# Patient Record
Sex: MALE | Race: BLACK OR AFRICAN AMERICAN | NOT HISPANIC OR LATINO | Employment: FULL TIME | URBAN - METROPOLITAN AREA
[De-identification: names, ages, dates, MRNs, and addresses within clinical notes are randomized per-mention and may not be internally consistent; named-entity substitution may affect disease eponyms.]

---

## 2017-03-22 ENCOUNTER — HOSPITAL ENCOUNTER (OUTPATIENT)
Facility: HOSPITAL | Age: 72
Discharge: HOME | End: 2017-03-23
Attending: EMERGENCY MEDICINE | Admitting: INTERNAL MEDICINE

## 2017-03-22 DIAGNOSIS — I25.110 CORONARY ARTERY DISEASE INVOLVING NATIVE CORONARY ARTERY OF NATIVE HEART WITH UNSTABLE ANGINA PECTORIS: ICD-10-CM

## 2017-03-22 DIAGNOSIS — R07.9 ACUTE CHEST PAIN: Primary | ICD-10-CM

## 2017-03-22 DIAGNOSIS — I10 ESSENTIAL HYPERTENSION: ICD-10-CM

## 2017-03-22 DIAGNOSIS — E29.1 TESTICULAR HYPOFUNCTION: ICD-10-CM

## 2017-03-22 LAB
ALBUMIN SERPL BCP-MCNC: 3.7 G/DL
ALP SERPL-CCNC: 55 U/L
ALT SERPL W/O P-5'-P-CCNC: 52 U/L
ANION GAP SERPL CALC-SCNC: 9 MMOL/L
AST SERPL-CCNC: 33 U/L
BASOPHILS # BLD AUTO: 0.02 K/UL
BASOPHILS NFR BLD: 0.3 %
BILIRUB SERPL-MCNC: 0.4 MG/DL
BNP SERPL-MCNC: <10 PG/ML
BUN SERPL-MCNC: 16 MG/DL
CALCIUM SERPL-MCNC: 9.5 MG/DL
CHLORIDE SERPL-SCNC: 102 MMOL/L
CK MB SERPL-MCNC: 1.3 NG/ML
CK MB SERPL-RTO: 0.7 %
CK SERPL-CCNC: 176 U/L
CO2 SERPL-SCNC: 27 MMOL/L
CREAT SERPL-MCNC: 1.3 MG/DL
DIFFERENTIAL METHOD: ABNORMAL
EOSINOPHIL # BLD AUTO: 0.2 K/UL
EOSINOPHIL NFR BLD: 2.6 %
ERYTHROCYTE [DISTWIDTH] IN BLOOD BY AUTOMATED COUNT: 13.1 %
EST. GFR  (AFRICAN AMERICAN): >60 ML/MIN/1.73 M^2
EST. GFR  (NON AFRICAN AMERICAN): 55 ML/MIN/1.73 M^2
GLUCOSE SERPL-MCNC: 99 MG/DL
HCT VFR BLD AUTO: 42.8 %
HGB BLD-MCNC: 14.8 G/DL
LYMPHOCYTES # BLD AUTO: 2 K/UL
LYMPHOCYTES NFR BLD: 29.5 %
MCH RBC QN AUTO: 31.2 PG
MCHC RBC AUTO-ENTMCNC: 34.6 %
MCV RBC AUTO: 90 FL
MONOCYTES # BLD AUTO: 0.6 K/UL
MONOCYTES NFR BLD: 8.4 %
NEUTROPHILS # BLD AUTO: 4.1 K/UL
NEUTROPHILS NFR BLD: 59.1 %
PLATELET # BLD AUTO: 242 K/UL
PMV BLD AUTO: 11.1 FL
POCT GLUCOSE: 107 MG/DL (ref 70–110)
POTASSIUM SERPL-SCNC: 3.5 MMOL/L
PROT SERPL-MCNC: 7 G/DL
RBC # BLD AUTO: 4.74 M/UL
SODIUM SERPL-SCNC: 138 MMOL/L
TROPONIN I SERPL DL<=0.01 NG/ML-MCNC: <0.006 NG/ML
TROPONIN I SERPL DL<=0.01 NG/ML-MCNC: <0.006 NG/ML
WBC # BLD AUTO: 6.88 K/UL

## 2017-03-22 PROCEDURE — 80053 COMPREHEN METABOLIC PANEL: CPT

## 2017-03-22 PROCEDURE — 99222 1ST HOSP IP/OBS MODERATE 55: CPT | Mod: ,,, | Performed by: INTERNAL MEDICINE

## 2017-03-22 PROCEDURE — 25000003 PHARM REV CODE 250: Performed by: INTERNAL MEDICINE

## 2017-03-22 PROCEDURE — G0378 HOSPITAL OBSERVATION PER HR: HCPCS

## 2017-03-22 PROCEDURE — 93571 IV DOP VEL&/PRESS C FLO 1ST: CPT | Mod: 74,LC

## 2017-03-22 PROCEDURE — 85025 COMPLETE CBC W/AUTO DIFF WBC: CPT

## 2017-03-22 PROCEDURE — 82962 GLUCOSE BLOOD TEST: CPT

## 2017-03-22 PROCEDURE — 93005 ELECTROCARDIOGRAM TRACING: CPT

## 2017-03-22 PROCEDURE — 92978 ENDOLUMINL IVUS OCT C 1ST: CPT | Mod: 26,LC,, | Performed by: INTERNAL MEDICINE

## 2017-03-22 PROCEDURE — 93458 L HRT ARTERY/VENTRICLE ANGIO: CPT | Mod: 26,,, | Performed by: INTERNAL MEDICINE

## 2017-03-22 PROCEDURE — C1753 CATH, INTRAVAS ULTRASOUND: HCPCS

## 2017-03-22 PROCEDURE — 25500020 PHARM REV CODE 255

## 2017-03-22 PROCEDURE — 25000003 PHARM REV CODE 250

## 2017-03-22 PROCEDURE — 25000003 PHARM REV CODE 250: Performed by: EMERGENCY MEDICINE

## 2017-03-22 PROCEDURE — 82553 CREATINE MB FRACTION: CPT

## 2017-03-22 PROCEDURE — 84484 ASSAY OF TROPONIN QUANT: CPT

## 2017-03-22 PROCEDURE — 63600175 PHARM REV CODE 636 W HCPCS

## 2017-03-22 PROCEDURE — 92978 ENDOLUMINL IVUS OCT C 1ST: CPT | Mod: LC

## 2017-03-22 PROCEDURE — 84484 ASSAY OF TROPONIN QUANT: CPT | Mod: 91

## 2017-03-22 PROCEDURE — 93571 IV DOP VEL&/PRESS C FLO 1ST: CPT | Mod: 26,52,LC, | Performed by: INTERNAL MEDICINE

## 2017-03-22 PROCEDURE — 99285 EMERGENCY DEPT VISIT HI MDM: CPT | Mod: 25

## 2017-03-22 PROCEDURE — 83880 ASSAY OF NATRIURETIC PEPTIDE: CPT

## 2017-03-22 RX ORDER — CLOPIDOGREL BISULFATE 75 MG/1
600 TABLET ORAL
Status: COMPLETED | OUTPATIENT
Start: 2017-03-22 | End: 2017-03-22

## 2017-03-22 RX ORDER — OXYCODONE HYDROCHLORIDE 5 MG/1
5 TABLET ORAL EVERY 4 HOURS PRN
Status: DISCONTINUED | OUTPATIENT
Start: 2017-03-22 | End: 2017-03-23 | Stop reason: HOSPADM

## 2017-03-22 RX ORDER — SODIUM CHLORIDE 9 MG/ML
INJECTION, SOLUTION INTRAVENOUS ONCE
Status: CANCELLED | OUTPATIENT
Start: 2017-03-22 | End: 2017-03-22

## 2017-03-22 RX ORDER — ASPIRIN 325 MG
325 TABLET ORAL
Status: COMPLETED | OUTPATIENT
Start: 2017-03-22 | End: 2017-03-22

## 2017-03-22 RX ORDER — SODIUM CHLORIDE 9 MG/ML
3 INJECTION, SOLUTION INTRAVENOUS CONTINUOUS
Status: ACTIVE | OUTPATIENT
Start: 2017-03-22 | End: 2017-03-22

## 2017-03-22 RX ORDER — ONDANSETRON 8 MG/1
8 TABLET, ORALLY DISINTEGRATING ORAL EVERY 8 HOURS PRN
Status: DISCONTINUED | OUTPATIENT
Start: 2017-03-22 | End: 2017-03-23 | Stop reason: HOSPADM

## 2017-03-22 RX ORDER — NITROGLYCERIN 0.4 MG/1
0.4 TABLET SUBLINGUAL EVERY 5 MIN PRN
Status: DISCONTINUED | OUTPATIENT
Start: 2017-03-22 | End: 2017-03-23 | Stop reason: HOSPADM

## 2017-03-22 RX ADMIN — CLOPIDOGREL 600 MG: 75 TABLET, FILM COATED ORAL at 01:03

## 2017-03-22 RX ADMIN — ASPIRIN 325 MG ORAL TABLET 325 MG: 325 PILL ORAL at 11:03

## 2017-03-22 RX ADMIN — NITROGLYCERIN 0.4 MG: 0.4 TABLET SUBLINGUAL at 11:03

## 2017-03-22 RX ADMIN — SODIUM CHLORIDE 3 ML/KG/HR: 0.9 INJECTION, SOLUTION INTRAVENOUS at 04:03

## 2017-03-22 NOTE — IP AVS SNAPSHOT
[facility]  [address]  [place] [address]  Phone: [phone]           Patient Discharge Instructions     Our goal is to set you up for success. This packet includes information on your condition, medications, and your home care. It will help you to care for yourself so you don't get sicker and need to go back to the hospital.     Please ask your nurse if you have any questions.        There are many details to remember when preparing to leave the hospital. Here is what you will need to do:    1. Take your medicine. If you are prescribed medications, review your Medication List in the following pages. You may have new medications to  at the pharmacy and others that you'll need to stop taking. Review the instructions for how and when to take your medications. Talk with your doctor or nurses if you are unsure of what to do.     2. Go to your follow-up appointments. Specific follow-up information is listed in the following pages. Your may be contacted by a transition nurse or clinical provider about future appointments. Be sure we have all of the phone numbers to reach you, if needed. Please contact your provider's office if you are unable to make an appointment.     3. Watch for warning signs. Your doctor or nurse will give you detailed warning signs to watch for and when to call for assistance. These instructions may also include educational information about your condition. If you experience any of warning signs to your health, call your doctor.               [facility]  Unless otherwise directed by your provider, please contact [facility], our nurse care line that is available for 24/7 assistance.     [phone] (toll-free)    Registered nurses in the [facility] provide clinical advisement, health education, appointment booking, and other advisory services.                    ** Verify the list of medication(s) below is accurate and up to date. Carry this  with you in case of emergency. If your medications have changed, please notify your healthcare provider.             Medication List      START taking these medications        Additional Info                      aspirin 81 MG Chew   Refills:  0   Dose:  81 mg    Instructions:  Take 1 tablet (81 mg total) by mouth once daily.     Begin Date    AM    Noon    PM    Bedtime       atorvastatin 40 MG tablet   Commonly known as:  LIPITOR   Quantity:  90 tablet   Refills:  3   Dose:  40 mg    Instructions:  Take 1 tablet (40 mg total) by mouth once daily.     Begin Date    AM    Noon    PM    Bedtime       carvedilol 3.125 MG tablet   Commonly known as:  COREG   Quantity:  60 tablet   Refills:  11   Dose:  3.125 mg    Instructions:  Take 1 tablet (3.125 mg total) by mouth 2 (two) times daily with meals.     Begin Date    AM    Noon    PM    Bedtime       nitroGLYCERIN 0.4 MG SL tablet   Commonly known as:  NITROSTAT   Quantity:  25 tablet   Refills:  11   Dose:  0.4 mg    Last time this was given:  0.4 mg on 3/22/2017 11:15 AM   Instructions:  Place 1 tablet (0.4 mg total) under the tongue every 5 (five) minutes as needed for Chest pain.     Begin Date    AM    Noon    PM    Bedtime         CONTINUE taking these medications        Additional Info                      INV LISINOPRIL-HCTZ 20-12.5   Refills:  0   Dose:  1 tablet    Instructions:  Take 1 tablet by mouth once daily. For investigational use only.     Begin Date    AM    Noon    PM    Bedtime         STOP taking these medications     UNKNOWN TO PATIENT            Where to Get Your Medications      These medications were sent to [facility]  [place] - [address] [facility]  [address] [place] [address]     Phone:  [phone]     atorvastatin 40 MG tablet    carvedilol 3.125 MG tablet    nitroGLYCERIN 0.4 MG SL tablet         You can get these medications from any pharmacy     You don't need a prescription for  these medications     aspirin 81 MG Chew                  Please bring to all follow up appointments:    1. A copy of your discharge instructions.  2. All medicines you are currently taking in their original bottles.  3. Identification and insurance card.    Please arrive 15 minutes ahead of scheduled appointment time.    Please call 24 hours in advance if you must reschedule your appointment and/or time.        Your Scheduled Appointments     Mar 30, 2017  1:00 PM CDT   Established Patient Visit with KIKI Nichols - Cardiology ([place])    [address]  [place] [id number] [phone]              Follow-up Information     Follow up with Siva Steele NP On 3/30/2017.    Specialty:  Internal Medicine    Why:  time: 1:00    Contact information:    [address]  [place] [address]  [phone]          Discharge Instructions     Future Orders    Diet general     Questions:    Total calories:      Fat restriction, if any:      Protein restriction, if any:      Na restriction, if any:      Fluid restriction:      Additional restrictions:  Cardiac (Low Na/Chol)    Lifting restrictions     Comments:    No heavy lifting over 10 lbs for 3-5 days    No dressing needed         Discharge Instructions       EATING HEART-HEALTHY FOODS (ENGLISH) View Edit Remove   HEART DISEASE, RISK FACTORS (ENGLISH) View Edit Remove   HEART DISEASE EDUCATION (ENGLISH) View Edit Remove   EXERCISE FOR A HEALTHIER HEART (ENGLISH) View Edit Remove   PREVENTION GUIDELINES, MEN AGES 65 AND OLDER (ENGLISH) View Edit Remove   CARDIAC CATHETERIZATION, DISCHARGE INSTRUCTIONS FOR (ENGLISH) View Edit Remove   ASPIRIN, ASA ORAL TABLETS (ENGLISH) View Edit Remove   ATORVASTATIN TABLETS (ENGLISH) View Edit Remove   CARVEDILOL TABLETS (ENGLISH) View Edit Remove         Discharge References/Attachments     EATING HEART-HEALTHY FOODS (ENGLISH)    HEART DISEASE, RISK FACTORS (ENGLISH)    HEART DISEASE EDUCATION  "(ENGLISH)    EXERCISE FOR A HEALTHIER HEART (ENGLISH)    PREVENTION GUIDELINES, MEN AGES 65 AND OLDER (ENGLISH)    CARDIAC CATHETERIZATION, DISCHARGE INSTRUCTIONS FOR (ENGLISH)    ASPIRIN, ASA ORAL TABLETS (ENGLISH)    ATORVASTATIN TABLETS (ENGLISH)    CARVEDILOL TABLETS (ENGLISH)        Primary Diagnosis     Your primary diagnosis was:  Acute Chest Pain      Admission Information     Date & Time Provider Department CSN    3/22/2017 10:28 AM Franklin Anthony MD [facility] [id number]      Care Providers     Provider Role Specialty Primary office phone    Franklin Anthony MD Attending Provider Cardiology [phone]      Your Vitals Were     BP Pulse Temp Resp Height Weight    157/83 (BP Location: Right arm, Patient Position: Lying, BP Method: Automatic) 69 98.6 °F (37 °C) (Oral) 18 6' 3" (1.905 m) 97.5 kg (215 lb)    SpO2 BMI             100% 26.87 kg/m2         Recent Lab Values     No lab values to display.      Allergies as of 3/23/2017     No Known Allergies      Advance Directives     An advance directive is a document which, in the event you are no longer able to make decisions for yourself, tells your healthcare team what kind of treatment you do or do not want to receive, or who you would like to make those decisions for you.  If you do not currently have an advance directive, [facility] encourages you to create one.  For more information call:  [phone]), [phone] ([phone]),  or log on to [email or web address].        Smoking Cessation     If you would like to quit smoking:   You may be eligible for free services if you are a [place] resident and started smoking cigarettes before September 1, 1988.  Call the Smoking Cessation [employer] (SCT) toll free at (580) 081-8111 or (431) 321-5410.   Call 3-800QUIT-NOW if you do not meet the above criteria.            Language Assistance Services     ATTENTION: Language assistance services are available, free of " charge. Please call [phone].      ATENCIÓN: Si barney ray, tiene a elmore disposición servicios gratuitos de asistencia lingüística. Llame al [phone].     CHÚ Ý: N?u b?n nói Ti?ng Vi?t, có các d?ch v? h? tr? ngôn ng? mi?n phí dành cho b?n. G?i s? [phone].        [facility] Sign-Up     Activating your [facility] account is as easy as 1-2-3!     1) Visit [email or web address], select Sign Up Now, enter this activation code and your date of birth, then select Next.  [email or web address]  Expires: 5/7/2017  9:46 AM      2) Create a username and password to use when you visit [facility] in the future and select a security question in case you lose your password and select Next.    3) Enter your e-mail address and click Sign Up!    Additional Information  If you have questions, please e-mail [email or web address] or call [phone] to talk to our [facility] staff. Remember, [facility] is NOT to be used for urgent needs. For medical emergencies, dial 911.          [facility] complies with applicable Federal civil rights laws and does not discriminate on the basis of race, color, national origin, age, disability, or sex.

## 2017-03-22 NOTE — SUBJECTIVE & OBJECTIVE
Past Medical History:   Diagnosis Date    Hypertension        Past Surgical History:   Procedure Laterality Date    APPENDECTOMY         Review of patient's allergies indicates:  No Known Allergies    No current facility-administered medications on file prior to encounter.      Current Outpatient Prescriptions on File Prior to Encounter   Medication Sig    UNKNOWN TO PATIENT      Family History     None        Social History Main Topics    Smoking status: Former Smoker    Smokeless tobacco: Not on file    Alcohol use No    Drug use: No    Sexual activity: Not Currently     Partners: Female     Review of Systems   Constitution: Negative for diaphoresis, weakness, malaise/fatigue, weight gain and weight loss.   Cardiovascular: Positive for chest pain. Negative for claudication, cyanosis, dyspnea on exertion, irregular heartbeat, leg swelling, near-syncope, orthopnea, palpitations, paroxysmal nocturnal dyspnea and syncope.   Respiratory: Negative for cough, hemoptysis, shortness of breath, sleep disturbances due to breathing, snoring, sputum production and wheezing.    Hematologic/Lymphatic: Negative for bleeding problem. Does not bruise/bleed easily.   Skin: Negative for poor wound healing and rash.   Musculoskeletal: Negative for myalgias.   Gastrointestinal: Negative for heartburn, hematemesis, hematochezia and melena.   Neurological: Negative for dizziness, light-headedness and loss of balance.   Psychiatric/Behavioral: Negative for altered mental status, depression and memory loss.     Objective:     Vital Signs (Most Recent):  Temp: 97.8 °F (36.6 °C) (03/22/17 1445)  Pulse: 62 (03/22/17 1530)  Resp: 14 (03/22/17 1530)  BP: (!) 152/70 (03/22/17 1530)  SpO2: 99 % (03/22/17 1530) Vital Signs (24h Range):  Temp:  [97.8 °F (36.6 °C)] 97.8 °F (36.6 °C)  Pulse:  [62-73] 62  Resp:  [14-20] 14  SpO2:  [95 %-99 %] 99 %  BP: (118-164)/(66-87) 152/70     Weight: 97.5 kg (215 lb)  Body mass index is 26.87  kg/(m^2).    SpO2: 99 %  O2 Device (Oxygen Therapy): room air    No intake or output data in the 24 hours ending 03/22/17 1546    Lines/Drains/Airways     Peripheral Intravenous Line                 Peripheral IV - Single Lumen 03/22/17 1059 Left Antecubital less than 1 day                Physical Exam   Constitutional: He is oriented to person, place, and time. He appears well-developed and well-nourished. No distress. He is not intubated.   HENT:   Head: Atraumatic.   Neck: No JVD present.   Cardiovascular: Normal rate, regular rhythm, S1 normal, S2 normal, normal heart sounds and intact distal pulses.  PMI is not displaced.  Exam reveals no gallop, no S3, no S4, no distant heart sounds, no friction rub, no midsystolic click and no opening snap.    No murmur heard.  Pulses:       Carotid pulses are 2+ on the right side, and 2+ on the left side.       Radial pulses are 2+ on the right side, and 2+ on the left side.        Femoral pulses are 2+ on the right side, and 2+ on the left side.       Popliteal pulses are 2+ on the right side, and 2+ on the left side.        Dorsalis pedis pulses are 2+ on the right side, and 2+ on the left side.        Posterior tibial pulses are 2+ on the right side, and 2+ on the left side.   Pulmonary/Chest: Effort normal and breath sounds normal. No accessory muscle usage. No apnea, no tachypnea and no bradypnea. He is not intubated. No respiratory distress. He has no decreased breath sounds. He has no wheezes. He has no rhonchi. He has no rales. He exhibits no tenderness.   Abdominal: Soft. Normal aorta and bowel sounds are normal. He exhibits no distension, no abdominal bruit, no pulsatile midline mass and no mass. There is no tenderness.   Musculoskeletal: He exhibits no edema.   Neurological: He is alert and oriented to person, place, and time.   Skin: Skin is warm. No rash noted. No erythema. No pallor.   Psychiatric: He has a normal mood and affect. His behavior is normal.  Judgment and thought content normal.   Vitals reviewed.      Significant Labs:   CMP   Recent Labs  Lab 03/22/17  1059      K 3.5      CO2 27   GLU 99   BUN 16   CREATININE 1.3   CALCIUM 9.5   PROT 7.0   ALBUMIN 3.7   BILITOT 0.4   ALKPHOS 55   AST 33   ALT 52*   ANIONGAP 9   ESTGFRAFRICA >60   EGFRNONAA 55*   , CBC   Recent Labs  Lab 03/22/17  1059   WBC 6.88   HGB 14.8   HCT 42.8      , INR No results for input(s): INR, PROTIME in the last 48 hours., Lipid Panel No results for input(s): CHOL, HDL, LDLCALC, TRIG, CHOLHDL in the last 48 hours. and Troponin   Recent Labs  Lab 03/22/17  1059   TROPONINI <0.006       Significant Imaging: Cardiac Cath: normal and X-Ray: CXR: X-Ray Chest 1 View (CXR): No results found for this visit on 03/22/17. and X-Ray Chest PA and Lateral (CXR): No results found for this visit on 03/22/17.

## 2017-03-22 NOTE — ED NOTES
Pt reports intermittent L sided chest pain x 1 week that became constant last night. Denies headache, vision changes, and SOB.  Occasional dizziness and nausea. Hx HTN. VSS, NAD.    APPEARANCE: Alert, oriented and in no acute distress.  CARDIAC: Normal rate and rhythm, no murmur heard.   PERIPHERAL VASCULAR: peripheral pulses present. Normal cap refill. No edema. Warm to touch.    RESPIRATORY:Normal rate and effort, breath sounds clear bilaterally throughout chest. Respirations are equal and unlabored no obvious signs of distress.  GASTRO: soft, bowel sounds normal, no tenderness, no abdominal distention.  MUSC: Full ROM. No bony tenderness or soft tissue tenderness. No obvious deformity.  SKIN: Skin is warm and dry, normal skin turgor, mucous membranes moist.  NEURO: 5/5 strength major flexors/extensors bilaterally. Sensory intact to light touch bilaterally. Weatherby coma scale: eyes open spontaneously-4, oriented & converses-5, obeys commands-6. No neurological abnormalities.   MENTAL STATUS: awake, alert and aware of environment.  EYE: PERRL, both eyes: pupils brisk and reactive to light. Normal size.  ENT: EARS: no obvious drainage. NOSE: no active bleeding.

## 2017-03-22 NOTE — NURSING TRANSFER
Gave report to CHLOE Mcdaniel at 1615 per protocol.  Released a total of 5 ml of air from [facility] prior to transfer.  Site is CDI and free of hematoma with good pulses.  Pt transferred  via stretcher to 2A room 231 with tele monitoring.  Family already present in room when arrived.  V/s stable all throughout recovery.  Pt has no complaints.  Informed CHLOE Mcdaniel of most recent vitals.  Informed RN that pt has not voided as of yet and that he has not eaten as well.  Disconnected transport monitor, room tele monitoring attached and assisted with moving patient to hospital bed.  Patient care transfered

## 2017-03-22 NOTE — ED PROVIDER NOTES
Encounter Date: 3/22/2017       History     Chief Complaint   Patient presents with    Chest Pain     began last night, nausea     Review of patient's allergies indicates:  No Known Allergies  HPI Comments: Patient is a 72 year old male who presents with chest pain that began about 3am this morning.  Had two episdoes.  First episode lasted about 10 minutes.  + dyspnea and diaphoresis.  No vomiting.  + nausea.  Former smoker.  No h/o drug abuse.  Resolved at this time.  Mild to moderate in severity.  No h/o CAD or CHF.     Patient is a 72 y.o. male presenting with the following complaint: chest pain. The history is provided by the patient.   Chest Pain   The current episode started today. Episode Length: first episode lasted approximately 10 minutes, second episode lasted longer, but unsure of duration. Episode frequency: twice. The chest pain is improving. The quality of the pain is described as aching. The pain does not radiate. Exacerbated by: palpation of chest wall worsens pain. Primary symptoms include shortness of breath. Pertinent negatives for primary symptoms include no fever, no cough and no abdominal pain.   The shortness of breath began today. The patient's medical history does not include CHF or COPD.   Associated symptoms include diaphoresis. He tried nothing for the symptoms. Risk factors include male gender and smoking/tobacco exposure.   His past medical history is significant for hypertension.   Pertinent negatives for past medical history include no CAD, no CHF, no diabetes and no hyperlipidemia.     Past Medical History:   Diagnosis Date    Hypertension      Past Surgical History:   Procedure Laterality Date    APPENDECTOMY       History reviewed. No pertinent family history.  Social History   Substance Use Topics    Smoking status: Former Smoker    Smokeless tobacco: None    Alcohol use No     Review of Systems   Constitutional: Positive for diaphoresis. Negative for fever.   HENT:  Negative for sore throat.    Eyes: Negative for pain.   Respiratory: Positive for shortness of breath. Negative for cough.    Cardiovascular: Positive for chest pain.   Gastrointestinal: Negative for abdominal pain.   Genitourinary: Negative for dysuria.   Musculoskeletal: Negative for back pain.   Skin: Negative for rash.   Neurological: Negative for headaches.   Psychiatric/Behavioral: Negative for confusion.       Physical Exam   Initial Vitals   BP Pulse Resp Temp SpO2   03/22/17 1025 03/22/17 1025 03/22/17 1025 03/22/17 1025 03/22/17 1025   164/85 73 20 97.8 °F (36.6 °C) 99 %     Physical Exam    Nursing note and vitals reviewed.  Constitutional: He appears well-developed and well-nourished. He is not diaphoretic. No distress.   HENT:   Head: Normocephalic and atraumatic.   Eyes: Conjunctivae are normal.   Neck: Normal range of motion. Neck supple.   Cardiovascular: Normal rate and regular rhythm.   Pulmonary/Chest: Breath sounds normal. No respiratory distress. He has no wheezes. He exhibits tenderness (left sided chest wall pain).   Abdominal: Soft. He exhibits no distension. There is no tenderness.   Musculoskeletal: Normal range of motion.   Neurological: He is alert and oriented to person, place, and time. He has normal strength.   Skin: Skin is warm and dry.   Psychiatric: He has a normal mood and affect.         ED Course   Procedures  Labs Reviewed   CBC W/ AUTO DIFFERENTIAL - Abnormal; Notable for the following:        Result Value    MCH 31.2 (*)     All other components within normal limits    Narrative:     PLEASE REVIEW ORDER START TIME BEFORE MARKING SPECIMEN  COLLECTED.   COMPREHENSIVE METABOLIC PANEL - Abnormal; Notable for the following:     ALT 52 (*)     eGFR if non  55 (*)     All other components within normal limits    Narrative:     PLEASE REVIEW ORDER START TIME BEFORE MARKING SPECIMEN  COLLECTED.   TROPONIN I    Narrative:     PLEASE REVIEW ORDER START TIME BEFORE  MARKING SPECIMEN  COLLECTED.   B-TYPE NATRIURETIC PEPTIDE    Narrative:     PLEASE REVIEW ORDER START TIME BEFORE MARKING SPECIMEN  COLLECTED.   TROPONIN I   POCT GLUCOSE     EKG Readings: (Independently Interpreted)   Initial Reading: No STEMI. Rhythm: Normal Sinus Rhythm. Heart Rate: 72. Ectopy: No Ectopy. Other Impression: nonspecific ST and Twave changes          Medical Decision Making:   Differential Diagnosis:   DDx: angina, unstable angina, NSTEMI, PE, pneumonia, pneumothorax  Clinical Tests:   Lab Tests: Ordered and Reviewed  The following lab test(s) were unremarkable: CBC, CMP, Troponin and BNP  Radiological Study: Ordered and Reviewed  Medical Tests: Ordered and Reviewed  ED Management:  Patient with possible unstable angina.  Normal blood work.  Will admit to Cardiology for further evaluation and rule out. Dr. Anthony.                   ED Course     Clinical Impression:   The encounter diagnosis was Acute chest pain.          Leonel Rendon MD  03/22/17 1610       Leonel Rendon MD  03/22/17 5752

## 2017-03-22 NOTE — H&P
[facility]  Cardiology  History and Physical     Patient Name: Jasmina Alonso  MRN: [id number]  Admission Date: 3/22/2017  Code Status: Full Code   Attending Provider: Leonel Rendon MD   Primary Care Physician: Primary Doctor No  Principal Problem:Acute chest pain    Patient information was obtained from patient and ER records.     Subjective:     Chief Complaint:  Chest pain     HPI:  Mr. Alonso is a 72 year-old [place] male who presented to the ED at [facility] earlier today with complaint of chest discomfort.  Patient reports symptoms began about 2 weeks ago, and have been intermittent, but worse with exertion such as ambulation.  Describes the symptoms as a squeezing discomfort, radiating into the left arm and the neck, lasting about 10 minutes when it occurs.  The symptoms were persisting and were worse today which provoked a visit to the ED.  The symptoms improved with SL NTG and ASA in the ED.  He also has had associated symptoms of diaphoresis with the chest discomfort, and symptoms have been frequently nocturnal also.      Past Medical History:   Diagnosis Date    Hypertension        Past Surgical History:   Procedure Laterality Date    APPENDECTOMY         Review of patient's allergies indicates:  No Known Allergies    No current facility-administered medications on file prior to encounter.      Current Outpatient Prescriptions on File Prior to Encounter   Medication Sig    UNKNOWN TO PATIENT      Family History     None        Social History Main Topics    Smoking status: Former Smoker    Smokeless tobacco: Not on file    Alcohol use No    Drug use: No    Sexual activity: Not Currently     Partners: Female     Review of Systems   Constitution: Negative for diaphoresis, weakness, malaise/fatigue, weight gain and weight loss.   Cardiovascular: Positive for chest pain. Negative for claudication, cyanosis, dyspnea on exertion, irregular heartbeat, leg swelling, near-syncope,  orthopnea, palpitations, paroxysmal nocturnal dyspnea and syncope.   Respiratory: Negative for cough, hemoptysis, shortness of breath, sleep disturbances due to breathing, snoring, sputum production and wheezing.    Hematologic/Lymphatic: Negative for bleeding problem. Does not bruise/bleed easily.   Skin: Negative for poor wound healing and rash.   Musculoskeletal: Negative for myalgias.   Gastrointestinal: Negative for heartburn, hematemesis, hematochezia and melena.   Neurological: Negative for dizziness, light-headedness and loss of balance.   Psychiatric/Behavioral: Negative for altered mental status, depression and memory loss.     Objective:     Vital Signs (Most Recent):  Temp: 97.8 °F (36.6 °C) (03/22/17 1445)  Pulse: 62 (03/22/17 1530)  Resp: 14 (03/22/17 1530)  BP: (!) 152/70 (03/22/17 1530)  SpO2: 99 % (03/22/17 1530) Vital Signs (24h Range):  Temp:  [97.8 °F (36.6 °C)] 97.8 °F (36.6 °C)  Pulse:  [62-73] 62  Resp:  [14-20] 14  SpO2:  [95 %-99 %] 99 %  BP: (118-164)/(66-87) 152/70     Weight: 97.5 kg (215 lb)  Body mass index is 26.87 kg/(m^2).    SpO2: 99 %  O2 Device (Oxygen Therapy): room air    No intake or output data in the 24 hours ending 03/22/17 1546    Lines/Drains/Airways     Peripheral Intravenous Line                 Peripheral IV - Single Lumen 03/22/17 1059 Left Antecubital less than 1 day                Physical Exam   Constitutional: He is oriented to person, place, and time. He appears well-developed and well-nourished. No distress. He is not intubated.   HENT:   Head: Atraumatic.   Neck: No JVD present.   Cardiovascular: Normal rate, regular rhythm, S1 normal, S2 normal, normal heart sounds and intact distal pulses.  PMI is not displaced.  Exam reveals no gallop, no S3, no S4, no distant heart sounds, no friction rub, no midsystolic click and no opening snap.    No murmur heard.  Pulses:       Carotid pulses are 2+ on the right side, and 2+ on the left side.       Radial pulses are 2+  on the right side, and 2+ on the left side.        Femoral pulses are 2+ on the right side, and 2+ on the left side.       Popliteal pulses are 2+ on the right side, and 2+ on the left side.        Dorsalis pedis pulses are 2+ on the right side, and 2+ on the left side.        Posterior tibial pulses are 2+ on the right side, and 2+ on the left side.   Pulmonary/Chest: Effort normal and breath sounds normal. No accessory muscle usage. No apnea, no tachypnea and no bradypnea. He is not intubated. No respiratory distress. He has no decreased breath sounds. He has no wheezes. He has no rhonchi. He has no rales. He exhibits no tenderness.   Abdominal: Soft. Normal aorta and bowel sounds are normal. He exhibits no distension, no abdominal bruit, no pulsatile midline mass and no mass. There is no tenderness.   Musculoskeletal: He exhibits no edema.   Neurological: He is alert and oriented to person, place, and time.   Skin: Skin is warm. No rash noted. No erythema. No pallor.   Psychiatric: He has a normal mood and affect. His behavior is normal. Judgment and thought content normal.   Vitals reviewed.      Significant Labs:   CMP   Recent Labs  Lab 03/22/17  1059      K 3.5      CO2 27   GLU 99   BUN 16   CREATININE 1.3   CALCIUM 9.5   PROT 7.0   ALBUMIN 3.7   BILITOT 0.4   ALKPHOS 55   AST 33   ALT 52*   ANIONGAP 9   ESTGFRAFRICA >60   EGFRNONAA 55*   , CBC   Recent Labs  Lab 03/22/17  1059   WBC 6.88   HGB 14.8   HCT 42.8      , INR No results for input(s): INR, PROTIME in the last 48 hours., Lipid Panel No results for input(s): CHOL, HDL, LDLCALC, TRIG, CHOLHDL in the last 48 hours. and Troponin   Recent Labs  Lab 03/22/17  1059   TROPONINI <0.006       Significant Imaging: Cardiac Cath: normal and X-Ray: CXR: X-Ray Chest 1 View (CXR): No results found for this visit on 03/22/17. and X-Ray Chest PA and Lateral (CXR): No results found for this visit on 03/22/17.    Assessment and Plan:     * Acute  chest pain  Admitted for unstable angina  With abnormal ECG.  Cardiac cath shows nonobstructive CAD.    Essential hypertension  Will restart home antihypertensive.    Anticipate discharge in AM.      VTE Risk Mitigation     None          Franklin Anthony MD  Cardiology   [facility]

## 2017-03-23 VITALS
OXYGEN SATURATION: 100 % | SYSTOLIC BLOOD PRESSURE: 157 MMHG | HEART RATE: 69 BPM | BODY MASS INDEX: 26.73 KG/M2 | TEMPERATURE: 99 F | RESPIRATION RATE: 18 BRPM | HEIGHT: 75 IN | DIASTOLIC BLOOD PRESSURE: 83 MMHG | WEIGHT: 215 LBS

## 2017-03-23 PROBLEM — I25.10 CORONARY ARTERY DISEASE INVOLVING NATIVE CORONARY ARTERY: Status: ACTIVE | Noted: 2017-03-23

## 2017-03-23 LAB
ANION GAP SERPL CALC-SCNC: 8 MMOL/L
BUN SERPL-MCNC: 16 MG/DL
CALCIUM SERPL-MCNC: 8.6 MG/DL
CHLORIDE SERPL-SCNC: 106 MMOL/L
CK MB SERPL-MCNC: 2.2 NG/ML
CK MB SERPL-RTO: 1.6 %
CK SERPL-CCNC: 137 U/L
CO2 SERPL-SCNC: 24 MMOL/L
CREAT SERPL-MCNC: 1.2 MG/DL
EST. GFR  (AFRICAN AMERICAN): >60 ML/MIN/1.73 M^2
EST. GFR  (NON AFRICAN AMERICAN): >60 ML/MIN/1.73 M^2
GLUCOSE SERPL-MCNC: 83 MG/DL
POC ACTIVATED CLOTTING TIME K: 198 SEC (ref 74–137)
POTASSIUM SERPL-SCNC: 3.5 MMOL/L
SAMPLE: ABNORMAL
SODIUM SERPL-SCNC: 138 MMOL/L
TROPONIN I SERPL DL<=0.01 NG/ML-MCNC: 0.09 NG/ML

## 2017-03-23 PROCEDURE — 84484 ASSAY OF TROPONIN QUANT: CPT

## 2017-03-23 PROCEDURE — G0378 HOSPITAL OBSERVATION PER HR: HCPCS

## 2017-03-23 PROCEDURE — 36415 COLL VENOUS BLD VENIPUNCTURE: CPT

## 2017-03-23 PROCEDURE — 94761 N-INVAS EAR/PLS OXIMETRY MLT: CPT

## 2017-03-23 PROCEDURE — 82553 CREATINE MB FRACTION: CPT

## 2017-03-23 PROCEDURE — 80048 BASIC METABOLIC PNL TOTAL CA: CPT

## 2017-03-23 PROCEDURE — 99224 PR SUBSEQUENT OBSERVATION CARE,LEVEL I: CPT | Mod: ,,, | Performed by: INTERNAL MEDICINE

## 2017-03-23 RX ORDER — ATORVASTATIN CALCIUM 40 MG/1
40 TABLET, FILM COATED ORAL DAILY
Qty: 90 TABLET | Refills: 3 | Status: SHIPPED | OUTPATIENT
Start: 2017-03-23 | End: 2018-03-23

## 2017-03-23 RX ORDER — NITROGLYCERIN 0.4 MG/1
0.4 TABLET SUBLINGUAL EVERY 5 MIN PRN
Qty: 25 TABLET | Refills: 11 | Status: SHIPPED | OUTPATIENT
Start: 2017-03-23 | End: 2018-03-23

## 2017-03-23 RX ORDER — CARVEDILOL 3.12 MG/1
3.12 TABLET ORAL 2 TIMES DAILY WITH MEALS
Qty: 60 TABLET | Refills: 11 | Status: SHIPPED | OUTPATIENT
Start: 2017-03-23 | End: 2018-03-23

## 2017-03-23 RX ORDER — NAPROXEN SODIUM 220 MG/1
81 TABLET, FILM COATED ORAL DAILY
Refills: 0 | COMMUNITY
Start: 2017-03-23 | End: 2018-03-23

## 2017-03-23 NOTE — DISCHARGE INSTRUCTIONS
EATING HEART-HEALTHY FOODS (ENGLISH) View Edit Remove   HEART DISEASE, RISK FACTORS (ENGLISH) View Edit Remove   HEART DISEASE EDUCATION (ENGLISH) View Edit Remove   EXERCISE FOR A HEALTHIER HEART (ENGLISH) View Edit Remove   PREVENTION GUIDELINES, MEN AGES 65 AND OLDER (ENGLISH) View Edit Remove   CARDIAC CATHETERIZATION, DISCHARGE INSTRUCTIONS FOR (ENGLISH) View Edit Remove   ASPIRIN, ASA ORAL TABLETS (ENGLISH) View Edit Remove   ATORVASTATIN TABLETS (ENGLISH) View Edit Remove   CARVEDILOL TABLETS (ENGLISH) View Edit Remove

## 2017-03-23 NOTE — SUBJECTIVE & OBJECTIVE
Interval History: ***    ROS  Objective:     Vital Signs (Most Recent):  Temp: 98.6 °F (37 °C) (17 0800)  Pulse: 69 (17 0800)  Resp: 18 (17 0800)  BP: (!) 157/83 (nurse Jonas was notified about pt. bp) (17 0800)  SpO2: 100 % (17 0905) Vital Signs (24h Range):  Temp:  [97.4 °F (36.3 °C)-98.6 °F (37 °C)] 98.6 °F (37 °C)  Pulse:  [58-76] 69  Resp:  [12-20] 18  SpO2:  [95 %-100 %] 100 %  BP: (118-157)/(66-87) 157/83     Weight: 97.5 kg (215 lb)  Body mass index is 26.87 kg/(m^2).     SpO2: 100 %  O2 Device (Oxygen Therapy): room air      Intake/Output Summary (Last 24 hours) at 17 1123  Last data filed at 17 0400   Gross per 24 hour   Intake              250 ml   Output             1100 ml   Net             -850 ml       Lines/Drains/Airways          No matching active lines, drains, or airways          Physical Exam    Significant Labs: {Labs:85940}    Significant Imaging: {Imagin}

## 2017-03-23 NOTE — PLAN OF CARE
03/23/17 0936   Discharge Assessment   Assessment Type Discharge Planning Assessment   Confirmed/corrected address and phone number on facesheet? Yes   Assessment information obtained from? Patient   Expected Length of Stay (days) 1   Communicated expected length of stay with patient/caregiver yes   Prior to hospitilization cognitive status: Alert/Oriented   Prior to hospitalization functional status: Independent   Current cognitive status: Alert/Oriented   Current Functional Status: Independent   Arrived From home or self-care   Lives With alone   Able to Return to Prior Arrangements yes   Is patient able to care for self after discharge? Yes   How many people do you have in your home that can help with your care after discharge? 1   Who are your caregiver(s) and their phone number(s)? Luisana Patel  [phone]   Patient's perception of discharge disposition home or selfcare   Readmission Within The Last 30 Days no previous admission in last 30 days   If YES, was patient admitted for the same reason? No   Patient currently being followed by outpatient case management? No   Patient currently receives home health services? No   Does the patient currently use HME? No   Patient currently receives private duty nursing? No   Patient currently receives any other outside agency services? No   Equipment Currently Used at Home none   Do you have any problems affording any of your prescribed medications? TBD   Is the patient taking medications as prescribed? yes   Do you have any financial concerns preventing you from receiving the healthcare you need? No   Does the patient have transportation to healthcare appointments? Yes   Transportation Available car;family or friend will provide   On Dialysis? No   Does the patient receive services at the Coumadin Clinic? No   Are there any open cases? No   Discharge Plan A Home   Discharge Plan B Home with family   Patient/Family In Agreement With Plan yes

## 2017-03-23 NOTE — PROGRESS NOTES
73yo male admitted with USA from the ER yesterday with LHC yesterday. Non obstructive CAD with normal LVEF and LVED. Chest pain free overnight with no complaints with ambulation post cath for abnormal CV function study. Angiogram revealed normal coronaries and normal LVEF    ROS: CV denies chest pain, SOB, paplitations; Lungs denies cough, wheezing or SOB; GI denies abdominal pain, nausea, vomiting, diarrhea, constipation; Extremities: denies pain, swelling or decreased sensation    PE: 73yo male resting in bed in NAD; Neuro AAOx3; CV S1S2 rrr; no m/r/g; Lungs CTA bilaterally; GI abdomen soft  NTND with active bowel sounds; Skin pink, warm to touch Extremities: radial pulses 2+ bilaterally; radial site soft with no active bleeding, hematoma or ecchymosis; brisk capillary refill    Assessment:    1. USA  2. Nonobstructive CAD  3. HTN  4. HLP    Plan:    [facility] with nonobstructive CAD; NO concern for NSTEMI; will treat aggressively for nonobstructive CAD with ASA, statin, BB and ACEI  Continue HCTZ as well   Ambulate in hallway with plans for discharge today  Follow up in 1-2 weeks in NP clinic and then will need to establish care with PCP

## 2017-03-23 NOTE — PLAN OF CARE
Problem: Patient Care Overview  Goal: Plan of Care Review  Outcome: Ongoing (interventions implemented as appropriate)  Patient AAOx4 throughout night shift. No complaints of pain. Patient urinating well.Ambulating to bathroom with stand by assistance. Skin warm, dry, intact and normal color of ethnicity. Bed locked and in lowest position. Patient oriented to call light, understanding verbalized. Family at bedside throughout shift assisting patient also. Telemetry monitor on and audible showing no red alarms.

## 2017-03-23 NOTE — PLAN OF CARE
03/23/17 1049   Final Note   Assessment Type Final Discharge Note   Discharge Disposition Home   Discharge planning education complete? Yes   What phone number can be called within the next 1-3 days to see how you are doing after discharge? [phone]   Hospital Follow Up  Appt(s) scheduled? Yes   Offered [facility] -- Bedside Delivery? Yes   Referral to Outpatient Case Management complete? n/a   Referral to / orders for Home Health Complete? n/a   30 day supply of medicines given at discharge, if documented non-compliance / non-adherence? n/a   Any social issues identified prior to discharge? No   Did you assess the readiness or willingness of the family or caregiver to support self management of care? Yes   Right Care Referral Info   Post Acute Recommendation No Care

## 2017-03-23 NOTE — DISCHARGE SUMMARY
[facility]  Cardiology  Discharge Summary      Patient Name: Jasmina Alonso  MRN: [id number]  Admission Date: 3/22/2017  Hospital Length of Stay: 0 days  Discharge Date and Time: 3/23/2017 12:21 PM  Attending Physician: No att. providers found  Discharging Provider: SARAH Goldman ANP  Primary Care Physician: Cameron Medel MD    HPI: Mr. Alonso is a 72 year-old [place]i male who presented to the ED [place] [facility] [place] today with complaint of chest discomfort.  Patient reports symptoms began about 2 weeks ago, and have been intermittent, but worse with exertion such as ambulation.  Describes the symptoms as a squeezing discomfort, radiating into the left arm and the neck, lasting about 10 minutes when it occurs.  The symptoms were persisting and were worse today which provoked a visit to the ED.  The symptoms improved with SL NTG and ASA in the ED.  He also has had associated symptoms of diaphoresis with the chest discomfort, and symptoms have been frequently nocturnal also.      Procedure(s) (LRB):  HEART CATH-LEFT (Right)     Indwelling Lines/Drains at time of discharge: none      Hospital Course 3/22/2017 Patient admitted for unstable angina.  Loaded with aspirin and plavix.  Transradial cardiac catheterization performed - 50% distal LCX stenosis - iFR = 1.0 - nonobstructive.  IVUS of LMCA show's insignificant stenosis.  Initial troponin normal.  3/23/2017 Recovered overnight on telemetry with no recurrent chest pain since admission. Slight trend upward of troponin but clinically insignificant and no concern for NSTEMI given nonobstructive CAD. Ambulated in the hallway with no complaints of chest pain or SOB. Deemed ready for discharge and sent home on good medication regimen with ASA, statin, BB and ACEI-HCTZ combination. No Plavix was given since no stent was placed and since NSTEMI was NOT a concern. Will follow up Siva Steele NP in clinic in 1-2 weeks and then will follow up  with PCP     Consults: none      Final Active Diagnoses:    Diagnosis Date Noted POA    PRINCIPAL PROBLEM:  Acute chest pain [R07.9] 03/22/2017 Yes    Coronary artery disease involving native coronary artery [I25.10] 03/23/2017 Unknown    Essential hypertension [I10] 03/22/2017 Yes      Problems Resolved During this Admission:    Diagnosis Date Noted Date Resolved POA       Discharged Condition: good    Follow Up:  Follow-up Information     Follow up with Siva Steele NP On 3/30/2017.    Specialty:  Internal Medicine    Why:  time: 1:00    Contact information:    [address] [address]  [phone]          Patient Instructions:     Diet general   Order Specific Question Answer Comments   Additional restrictions: Cardiac (Low Na/Chol)      Lifting restrictions   Order Comments: No heavy lifting over 10 lbs for 3-5 days     No dressing needed       Medications:  Reconciled Home Medications:   Discharge Medication List as of 3/23/2017 11:17 AM      START taking these medications    Details   aspirin 81 MG Chew Take 1 tablet (81 mg total) by mouth once daily., Starting 3/23/2017, Until Fri 3/23/18, OTC      atorvastatin (LIPITOR) 40 MG tablet Take 1 tablet (40 mg total) by mouth once daily., Starting 3/23/2017, Until Fri 3/23/18, Normal      carvedilol (COREG) 3.125 MG tablet Take 1 tablet (3.125 mg total) by mouth 2 (two) times daily with meals., Starting 3/23/2017, Until Fri 3/23/18, Normal      nitroGLYCERIN (NITROSTAT) 0.4 MG SL tablet Place 1 tablet (0.4 mg total) under the tongue every 5 (five) minutes as needed for Chest pain., Starting 3/23/2017, Until Fri 3/23/18, Normal         CONTINUE these medications which have NOT CHANGED    Details   INV LISINOPRIL-HCTZ 20-12.5 Take 1 tablet by mouth once daily. For investigational use only., Until Discontinued, Historical Med         STOP taking these medications       UNKNOWN TO PATIENT Comments:   Reason for Stopping:               Time spent  on the discharge of patient: 20 minutes    SARAH Goldman, ANP  Cardiology  [facility]    I have seen patient with Nurse Practitioner Sahra Velazquez. I agree with her assessment and plan as written in this note.        Franklin Anthony MD, FACC, CCDS  Interventional Cardiology  [facility]

## 2017-03-23 NOTE — PLAN OF CARE
Problem: Patient Care Overview  Goal: Plan of Care Review  Outcome: Ongoing (interventions implemented as appropriate)  Pt on RA, SPO2  100%.  Pt in no apparent respiratory distress. Will continue to monitor.

## 2017-03-24 PROBLEM — I25.10 CORONARY ARTERY DISEASE INVOLVING NATIVE CORONARY ARTERY: Status: ACTIVE | Noted: 2017-03-24

## 2017-03-27 LAB — CORONARY STENOSIS: ABNORMAL

## 2017-03-30 ENCOUNTER — OFFICE VISIT (OUTPATIENT)
Dept: CARDIOLOGY | Facility: CLINIC | Age: 72
End: 2017-03-30

## 2017-03-30 VITALS
SYSTOLIC BLOOD PRESSURE: 170 MMHG | WEIGHT: 198 LBS | BODY MASS INDEX: 26.24 KG/M2 | HEIGHT: 73 IN | DIASTOLIC BLOOD PRESSURE: 90 MMHG | HEART RATE: 60 BPM

## 2017-03-30 DIAGNOSIS — I10 ESSENTIAL HYPERTENSION: ICD-10-CM

## 2017-03-30 DIAGNOSIS — N52.9 ERECTILE DYSFUNCTION, UNSPECIFIED ERECTILE DYSFUNCTION TYPE: Primary | ICD-10-CM

## 2017-03-30 DIAGNOSIS — I25.10 CORONARY ARTERY DISEASE INVOLVING NATIVE CORONARY ARTERY OF NATIVE HEART WITHOUT ANGINA PECTORIS: ICD-10-CM

## 2017-03-30 DIAGNOSIS — E29.1 TESTICULAR FAILURE: ICD-10-CM

## 2017-03-30 PROBLEM — R07.9 ACUTE CHEST PAIN: Status: RESOLVED | Noted: 2017-03-22 | Resolved: 2017-03-30

## 2017-03-30 PROCEDURE — 99213 OFFICE O/P EST LOW 20 MIN: CPT | Mod: PBBFAC,PO | Performed by: NURSE PRACTITIONER

## 2017-03-30 PROCEDURE — 99999 PR PBB SHADOW E&M-EST. PATIENT-LVL III: CPT | Mod: PBBFAC,,, | Performed by: NURSE PRACTITIONER

## 2017-03-30 PROCEDURE — 99212 OFFICE O/P EST SF 10 MIN: CPT | Mod: S$PBB,,, | Performed by: NURSE PRACTITIONER

## 2017-03-30 NOTE — PROGRESS NOTES
Subjective:       Patient ID: Jasmina Alonso is a 72 y.o. male recently discharged here for hospital follow up.     Chief Complaint: No chief complaint on file.    HPI Comments: Jasmina Alonso is a 72 y.o. male who [place] [facility]  [place] complaints of chest discomfort. 3/22/2017 Patient admitted for unstable angina. Loaded with aspirin and plavix. Transradial cardiac catheterization performed - 50% distal LCX stenosis - iFR = 1.0 - nonobstructive. IVUS of LMCA show's insignificant stenosis. Initial troponin normal. 3/23/2017 Recovered overnight on telemetry with no recurrent chest pain since admission. Slight trend upward of troponin but clinically insignificant and no concern for NSTEMI given nonobstructive CAD. Ambulated in the hallway with no complaints of chest pain or SOB. Deemed ready for discharge and sent home on good medication regimen with ASA, statin, BB and ACEI-HCTZ combination. No Plavix was given since no stent was placed and since NSTEMI was NOT a concern.   Patient has been doing well since discharge without recurrence of chest pain. He has been active and mostly compliant with his medications. He forgot to take them this morning. His only complaint today is erectile dysfunction which he reports has been persistent for several months    Review of Systems   Constitutional: Negative for diaphoresis and fatigue.   HENT: Negative.    Eyes: Negative.    Respiratory: Negative for cough, chest tightness, shortness of breath, wheezing and stridor.    Cardiovascular: Negative for chest pain, palpitations and leg swelling.   Gastrointestinal: Negative.    Endocrine: Negative.    Musculoskeletal: Negative.    Skin: Negative.    Allergic/Immunologic: Negative.    Neurological: Negative for dizziness, syncope, speech difficulty, weakness and light-headedness.   Hematological: Negative.    Psychiatric/Behavioral: Negative.        Objective:      Physical Exam   Constitutional: He is oriented  to person, place, and time. He appears well-developed and well-nourished. No distress.   HENT:   Head: Normocephalic and atraumatic.   Eyes: Right eye exhibits no discharge. Left eye exhibits no discharge.   Neck: No JVD present.   Cardiovascular: Normal rate, regular rhythm and normal heart sounds.  Exam reveals no gallop and no friction rub.    No murmur heard.  Pulmonary/Chest: Effort normal and breath sounds normal. No respiratory distress. He has no wheezes. He has no rales. He exhibits no tenderness.   Abdominal: Soft. Bowel sounds are normal.   Musculoskeletal: He exhibits no edema or tenderness.   Neurological: He is alert and oriented to person, place, and time.   Skin: Skin is warm and dry. He is not diaphoretic.   Psychiatric: He has a normal mood and affect. His behavior is normal. Judgment and thought content normal.       Assessment:       1. Erectile dysfunction, unspecified erectile dysfunction type    2. Coronary artery disease involving native coronary artery of native heart without angina pectoris    3. Testicular failure    4. Essential hypertension        Plan:       Diagnoses and all orders for this visit:    Erectile dysfunction, unspecified erectile dysfunction type  Testicular failure  -     Ambulatory referral to Urology    Coronary artery disease involving native coronary artery of native heart without angina pectoris    Essential hypertension    Medication compliance encouraged  Continue cardiovascular exercise 4+ days a week  Cardiac diet   RTC 1 year, sooner for concerns    S/P LHC  Patient has a right dominant coronary artery.        - Left Main Coronary Artery:             IVUS showed the ostial LM has a 40% stenosis. There is MCKENZIE 3 flow. by IVUS this lesion is nonobstructive - clearly greater than 7 mm2 in CSA.     - Left Anterior Descending Artery:             The LAD is normal. There is MCKENZIE 3 flow.     - Left Circumflex Artery:             The distal LCX has a 50% stenosis. There  is MCKENZIE 3 flow. FFR was 1.0.                     Lesion Details:   The length is <10 mm, concentric shape.     - Right Coronary Artery:             The RCA is normal. There is MCKENZIE 3 flow.    Results for orders placed or performed during the hospital encounter of 03/22/17   CBC auto differential   Result Value Ref Range    WBC 6.88 3.90 - 12.70 K/uL    RBC 4.74 4.60 - 6.20 M/uL    Hemoglobin 14.8 14.0 - 18.0 g/dL    Hematocrit 42.8 40.0 - 54.0 %    MCV 90 82 - 98 fL    MCH 31.2 (H) 27.0 - 31.0 pg    MCHC 34.6 32.0 - 36.0 %    RDW 13.1 11.5 - 14.5 %    Platelets 242 150 - 350 K/uL    MPV 11.1 9.2 - 12.9 fL    Gran # 4.1 1.8 - 7.7 K/uL    Lymph # 2.0 1.0 - 4.8 K/uL    Mono # 0.6 0.3 - 1.0 K/uL    Eos # 0.2 0.0 - 0.5 K/uL    Baso # 0.02 0.00 - 0.20 K/uL    Gran% 59.1 38.0 - 73.0 %    Lymph% 29.5 18.0 - 48.0 %    Mono% 8.4 4.0 - 15.0 %    Eosinophil% 2.6 0.0 - 8.0 %    Basophil% 0.3 0.0 - 1.9 %    Differential Method Automated    Comprehensive metabolic panel   Result Value Ref Range    Sodium 138 136 - 145 mmol/L    Potassium 3.5 3.5 - 5.1 mmol/L    Chloride 102 95 - 110 mmol/L    CO2 27 23 - 29 mmol/L    Glucose 99 70 - 110 mg/dL    BUN, Bld 16 8 - 23 mg/dL    Creatinine 1.3 0.5 - 1.4 mg/dL    Calcium 9.5 8.7 - 10.5 mg/dL    Total Protein 7.0 6.0 - 8.4 g/dL    Albumin 3.7 3.5 - 5.2 g/dL    Total Bilirubin 0.4 0.1 - 1.0 mg/dL    Alkaline Phosphatase 55 55 - 135 U/L    AST 33 10 - 40 U/L    ALT 52 (H) 10 - 44 U/L    Anion Gap 9 8 - 16 mmol/L    eGFR if African American >60 >60 mL/min/1.73 m^2    eGFR if non African American 55 (A) >60 mL/min/1.73 m^2   Troponin I   Result Value Ref Range    Troponin I <0.006 0.000 - 0.026 ng/mL   B-Type natriuretic peptide (BNP)   Result Value Ref Range    BNP <10 0 - 99 pg/mL   Troponin I in 6 hours   Result Value Ref Range    Troponin I <0.006 0.000 - 0.026 ng/mL   CK-MB in 6 hours   Result Value Ref Range     20 - 200 U/L    CPK MB 1.3 0.1 - 6.5 ng/mL    MB% 0.7 0.0 - 5.0 %    Basic metabolic panel   Result Value Ref Range    Sodium 138 136 - 145 mmol/L    Potassium 3.5 3.5 - 5.1 mmol/L    Chloride 106 95 - 110 mmol/L    CO2 24 23 - 29 mmol/L    Glucose 83 70 - 110 mg/dL    BUN, Bld 16 8 - 23 mg/dL    Creatinine 1.2 0.5 - 1.4 mg/dL    Calcium 8.6 (L) 8.7 - 10.5 mg/dL    Anion Gap 8 8 - 16 mmol/L    eGFR if African American >60 >60 mL/min/1.73 m^2    eGFR if non African American >60 >60 mL/min/1.73 m^2   CK-MB in a.m.   Result Value Ref Range     20 - 200 U/L    CPK MB 2.2 0.1 - 6.5 ng/mL    MB% 1.6 0.0 - 5.0 %   Troponin I in a.m.   Result Value Ref Range    Troponin I 0.093 (H) 0.000 - 0.026 ng/mL   Cath lab procedure   Result Value Ref Range    Coronary Stenosis >= 50% (A)    POCT glucose   Result Value Ref Range    POCT Glucose 107 70 - 110 mg/dL   ISTAT ACT-K   Result Value Ref Range    POC ACTIVATED CLOTTING TIME K 198 (H) 74 - 137 sec    Sample ART

## 2017-04-03 ENCOUNTER — OFFICE VISIT (OUTPATIENT)
Dept: UROLOGY | Facility: CLINIC | Age: 72
End: 2017-04-03

## 2017-04-03 ENCOUNTER — LAB VISIT (OUTPATIENT)
Dept: LAB | Facility: HOSPITAL | Age: 72
End: 2017-04-03
Attending: UROLOGY

## 2017-04-03 VITALS
HEIGHT: 73 IN | DIASTOLIC BLOOD PRESSURE: 83 MMHG | BODY MASS INDEX: 26.24 KG/M2 | SYSTOLIC BLOOD PRESSURE: 126 MMHG | HEART RATE: 76 BPM | TEMPERATURE: 98 F | WEIGHT: 198 LBS

## 2017-04-03 DIAGNOSIS — I25.10 CORONARY ARTERY DISEASE INVOLVING NATIVE CORONARY ARTERY OF NATIVE HEART WITHOUT ANGINA PECTORIS: ICD-10-CM

## 2017-04-03 DIAGNOSIS — Z12.5 PROSTATE CANCER SCREENING: ICD-10-CM

## 2017-04-03 DIAGNOSIS — N52.9 ERECTILE DYSFUNCTION, UNSPECIFIED ERECTILE DYSFUNCTION TYPE: ICD-10-CM

## 2017-04-03 DIAGNOSIS — N40.1 BPH NOS W UR OBS/LUTS: ICD-10-CM

## 2017-04-03 DIAGNOSIS — N52.9 ERECTILE DYSFUNCTION, UNSPECIFIED ERECTILE DYSFUNCTION TYPE: Primary | ICD-10-CM

## 2017-04-03 DIAGNOSIS — N46.9 INFERTILITY MALE: ICD-10-CM

## 2017-04-03 LAB
COMPLEXED PSA SERPL-MCNC: 1.6 NG/ML
TESTOST SERPL-MCNC: 425 NG/DL

## 2017-04-03 PROCEDURE — 99999 PR PBB SHADOW E&M-EST. PATIENT-LVL III: CPT | Mod: PBBFAC,,, | Performed by: UROLOGY

## 2017-04-03 PROCEDURE — 84403 ASSAY OF TOTAL TESTOSTERONE: CPT

## 2017-04-03 PROCEDURE — 36415 COLL VENOUS BLD VENIPUNCTURE: CPT

## 2017-04-03 PROCEDURE — 99204 OFFICE O/P NEW MOD 45 MIN: CPT | Mod: S$PBB,,, | Performed by: UROLOGY

## 2017-04-03 PROCEDURE — 99213 OFFICE O/P EST LOW 20 MIN: CPT | Mod: PBBFAC,PO | Performed by: UROLOGY

## 2017-04-03 PROCEDURE — 84153 ASSAY OF PSA TOTAL: CPT

## 2017-04-03 NOTE — MR AVS SNAPSHOT
[facility]  [address]  [place] [address]  Phone: [phone]                  Jasmina Alonso   4/3/2017 3:15 PM   Office Visit    Description:  Male : 1945   Provider:  Scar Agrawal MD   Department:  [facility]           Reason for Visit     Erectile Dysfunction           Diagnoses this Visit        Comments    Erectile dysfunction, unspecified erectile dysfunction type    -  Primary     Prostate cancer screening         BPH NOS w ur obs/LUTS         Infertility male         Coronary artery disease involving native coronary artery of native heart without angina pectoris                To Do List           Future Appointments        Provider Department Dept Phone    4/3/2017 4:40 PM APPOINTMENT LAB, [place] [facility] [phone]    2017 1:00 PM Scar Agrawal MD [facility] [phone]      Goals (5 Years of Data)     None      Follow-Up and Disposition     Return in about 1 month (around 5/3/2017).      [facility]     [facility] Care Line -  Assistance  Unless otherwise directed by your provider, please contact [facility], our nurse care line that is available for  assistance.     Registered nurses in the [facility] provide: appointment scheduling, clinical advisement, health education, and other advisory services.  Call: [phone] (toll free)               Medications           Message regarding Medications     Verify the changes and/or additions to your medication regime listed below are the same as discussed with your clinician today.  If any of these changes or additions are incorrect, please notify your healthcare provider.             Verify that the below list of medications is an accurate representation of the medications you are currently taking.  If none reported, the list may be blank. If incorrect, please contact your healthcare provider. Carry this list with you in  "case of emergency.           Current Medications     aspirin 81 MG Chew Take 1 tablet (81 mg total) by mouth once daily.    atorvastatin (LIPITOR) 40 MG tablet Take 1 tablet (40 mg total) by mouth once daily.    carvedilol (COREG) 3.125 MG tablet Take 1 tablet (3.125 mg total) by mouth 2 (two) times daily with meals.    INV LISINOPRIL-HCTZ 20-12.5 Take 1 tablet by mouth once daily. For investigational use only.    nitroGLYCERIN (NITROSTAT) 0.4 MG SL tablet Place 1 tablet (0.4 mg total) under the tongue every 5 (five) minutes as needed for Chest pain.           Clinical Reference Information           Your Vitals Were     BP Pulse Temp Height Weight BMI    126/83 76 97.9 °F (36.6 °C) 6' 1" (1.854 m) 89.8 kg (198 lb) 26.12 kg/m2      Blood Pressure          Most Recent Value    BP  126/83      Allergies as of 4/3/2017     No Known Allergies      Immunizations Administered on Date of Encounter - 4/3/2017     None      Orders Placed During Today's Visit     Future Labs/Procedures Expected by Expires    PSA, Screening  4/3/2017 6/2/2018    Testosterone  4/3/2017 4/3/2018      [facility] Sign-Up     Activating your [facility] account is as easy as 1-2-3!     1) Visit [email or web address], select Sign Up Now, enter this activation code and your date of birth, then select Next.  [email or web address]  Expires: 5/7/2017  9:46 AM      2) Create a username and password to use when you visit [facility] in the future and select a security question in case you lose your password and select Next.    3) Enter your e-mail address and click Sign Up!    Additional Information  If you have questions, please e-mail [email or web address] or call [phone] to talk to our [facility] staff. Remember, [facility] is NOT to be used for urgent needs. For medical emergencies, dial 911.         Language Assistance Services     ATTENTION: Language assistance services are available, free of charge. Please call [phone].      ATENCIÓN: Si barney ray, " tiene a elmore disposición servicios gratuitos de asistencia lingüística. Lldelbert al [phone].     MOR Ý: N?u b?n nói Ti?ng Vi?t, có các d?ch v? h? tr? ngôn ng? mi?n phí dành cho b?n. G?i s? [phone].         [facility] complies with applicable Federal civil rights laws and does not discriminate on the basis of race, color, national origin, age, disability, or sex.

## 2017-04-03 NOTE — PROGRESS NOTES
"HPI:  Jasmina Alonso is a 72 y.o. year old male that  presents with   Chief Complaint   Patient presents with    Erectile Dysfunction   .  This patient is referred by his PCP for erectile dysfunction.  Patient states that this is been going on for 2-3 months.  In 2013 he was evaluated for infertility and was found to have a low sperm count with no erectile dysfunction at that time.  He was referred to [facility] for IVF    Patient has a "normal" strength of stream with nocturia ×2.  He has no dysuria and he is satisfied with his voiding pattern    There is no family history of prostate cancer and he has never had any operations on his kidneys bladder or scrotum    Chart review shows a GFR greater than 60 last month and a normal serum testosterone in 2013.  There are no urological related x-rays      Past Medical History:   Diagnosis Date    Hypertension      Social History     Social History    Marital status:      Spouse name: N/A    Number of children: N/A    Years of education: N/A     Occupational History    Not on file.     Social History Main Topics    Smoking status: Former Smoker    Smokeless tobacco: Never Used    Alcohol use No    Drug use: No    Sexual activity: Yes     Partners: Female     Other Topics Concern    Not on file     Social History Narrative     Past Surgical History:   Procedure Laterality Date    APPENDECTOMY       Family History   Problem Relation Age of Onset    Prostate cancer Neg Hx     Kidney disease Neg Hx            Review of Systems  The patient has  chest pains.  The patient has no shortness of breath  Patient wears no glasses.  All other review of systems are negative.      Physical Exam:  /83  Pulse 76  Temp 97.9 °F (36.6 °C)  Ht 6' 1" (1.854 m)  Wt 89.8 kg (198 lb)  BMI 26.12 kg/m2  General appearance: alert, cooperative, no distress  Constitutional:Oriented to person, place, and time.appears well-developed and " well-nourished.   HEENT: Normocephalic, atraumatic, neck symmetrical, no nasal discharge   Eyes: conjunctivae/corneas clear, PERRL, EOM's intact  Lungs: clear to auscultation bilaterally, no dullness to percussion bilaterally  Heart: regular rate and rhythm without rub; no displacement of the PMI   Abdomen: soft, non-tender; bowel sounds normoactive; no organomegaly  :Penis/perineum without lesions, scrotum without rash/cysts, epididymis nontender bilaterally, urethral meatus in normal location normal size, no penile plaques palpated, prostate:      Smooth and minimally enlarged and benign feeling                 seminal vesicles not palpated.  No rectal masses, sphincter tone normal.  Testes equal in size without masses  Extremities: extremities symmetric; no clubbing, cyanosis, or edema  Integument: Skin color, texture, turgor normal; no rashes; hair distrubution normal  Neurologic: Alert and oriented X 3, normal strength, normal coordination and gait  Psychiatric: no pressured speech; normal affect; no evidence of impaired cognition     LABS:    Complete Blood Count  Lab Results   Component Value Date    RBC 4.74 03/22/2017    HGB 14.8 03/22/2017    HCT 42.8 03/22/2017    MCV 90 03/22/2017    MCH 31.2 (H) 03/22/2017    MCHC 34.6 03/22/2017    RDW 13.1 03/22/2017     03/22/2017    MPV 11.1 03/22/2017    GRAN 4.1 03/22/2017    GRAN 59.1 03/22/2017    LYMPH 2.0 03/22/2017    LYMPH 29.5 03/22/2017    MONO 0.6 03/22/2017    MONO 8.4 03/22/2017    EOS 0.2 03/22/2017    BASO 0.02 03/22/2017    EOSINOPHIL 2.6 03/22/2017    BASOPHIL 0.3 03/22/2017    DIFFMETHOD Automated 03/22/2017       Comprehensive Metabolic Panel  Lab Results   Component Value Date    GLU 83 03/23/2017    BUN 16 03/23/2017    CREATININE 1.2 03/23/2017     03/23/2017    K 3.5 03/23/2017     03/23/2017    PROT 7.0 03/22/2017    ALBUMIN 3.7 03/22/2017    BILITOT 0.4 03/22/2017    AST 33 03/22/2017    ALKPHOS 55 03/22/2017    CO2 24  03/23/2017    ALT 52 (H) 03/22/2017    ANIONGAP 8 03/23/2017    EGFRNONAA >60 03/23/2017    ESTGFRAFRICA >60 03/23/2017       PSA  No results found for: PSA      Assessment:    ICD-10-CM ICD-9-CM    1. Erectile dysfunction, unspecified erectile dysfunction type N52.9 607.84 Testosterone   2. Prostate cancer screening Z12.5 V76.44 PSA, Screening   3. BPH NOS w ur obs/LUTS N40.1 600.91    4. Infertility male N46.9 606.9    5. Coronary artery disease involving native coronary artery of native heart without angina pectoris I25.10 414.01      The primary encounter diagnosis was Erectile dysfunction, unspecified erectile dysfunction type. Diagnoses of Prostate cancer screening, BPH NOS w ur obs/LUTS, Infertility male, and Coronary artery disease involving native coronary artery of native heart without angina pectoris were also pertinent to this visit.      Plan: #1 erectile dysfunction.  We'll check a serum testosterone and patient will follow up with me in approximately 1 month.  Discussed that since his erectile dysfunction is of short duration, it might resolve on its own.  I further discussed that since he has an active prescription for nitroglycerin that he is not a candidate for oral agents.  I discussed in general terms external vacuum pump    #2 prostate cancer screening.  I discussed risks and benefits of prostate cancer screening.  Patient wants to have it drawn.  We'll review results at next office visit    #3 BPH.  Plan.  Patient satisfied with his voiding pattern on no therapy needed    #4 infertility.  Plan.  This was worked up in 2013 and no further evaluation needed    #5 coronary artery disease.  Plan.  As discussed in #1   Orders Placed This Encounter   Procedures    PSA, Screening    Testosterone           Scar Agrawal MD